# Patient Record
Sex: MALE | Race: WHITE | NOT HISPANIC OR LATINO | Employment: OTHER | URBAN - METROPOLITAN AREA
[De-identification: names, ages, dates, MRNs, and addresses within clinical notes are randomized per-mention and may not be internally consistent; named-entity substitution may affect disease eponyms.]

---

## 2024-10-02 ENCOUNTER — APPOINTMENT (OUTPATIENT)
Dept: CARDIOLOGY | Facility: HOSPITAL | Age: 74
End: 2024-10-02
Payer: MEDICARE

## 2024-10-02 ENCOUNTER — HOSPITAL ENCOUNTER (EMERGENCY)
Facility: HOSPITAL | Age: 74
Discharge: HOME | End: 2024-10-02
Attending: EMERGENCY MEDICINE
Payer: MEDICARE

## 2024-10-02 ENCOUNTER — APPOINTMENT (OUTPATIENT)
Dept: RADIOLOGY | Facility: HOSPITAL | Age: 74
End: 2024-10-02
Payer: MEDICARE

## 2024-10-02 VITALS
HEIGHT: 71 IN | BODY MASS INDEX: 25.9 KG/M2 | RESPIRATION RATE: 19 BRPM | WEIGHT: 185 LBS | TEMPERATURE: 98 F | HEART RATE: 73 BPM | OXYGEN SATURATION: 98 % | SYSTOLIC BLOOD PRESSURE: 120 MMHG | DIASTOLIC BLOOD PRESSURE: 92 MMHG

## 2024-10-02 DIAGNOSIS — I49.3 FREQUENT PVCS: ICD-10-CM

## 2024-10-02 DIAGNOSIS — R55 SYNCOPE AND COLLAPSE: ICD-10-CM

## 2024-10-02 DIAGNOSIS — S09.90XA CLOSED HEAD INJURY, INITIAL ENCOUNTER: Primary | ICD-10-CM

## 2024-10-02 DIAGNOSIS — S60.222A CONTUSION OF DORSUM OF LEFT HAND: ICD-10-CM

## 2024-10-02 LAB
ABO GROUP (TYPE) IN BLOOD: NORMAL
ALBUMIN SERPL BCP-MCNC: 4 G/DL (ref 3.4–5)
ALP SERPL-CCNC: 108 U/L (ref 33–136)
ALT SERPL W P-5'-P-CCNC: 12 U/L (ref 10–52)
ANION GAP SERPL CALCULATED.3IONS-SCNC: 9 MMOL/L (ref 10–20)
ANTIBODY SCREEN: NORMAL
AST SERPL W P-5'-P-CCNC: 15 U/L (ref 9–39)
BASOPHILS # BLD AUTO: 0.04 X10*3/UL (ref 0–0.1)
BASOPHILS NFR BLD AUTO: 0.4 %
BILIRUB SERPL-MCNC: 1.2 MG/DL (ref 0–1.2)
BODY SURFACE AREA: 2.05 M2
BUN SERPL-MCNC: 22 MG/DL (ref 6–23)
CALCIUM SERPL-MCNC: 9.1 MG/DL (ref 8.6–10.3)
CHLORIDE SERPL-SCNC: 107 MMOL/L (ref 98–107)
CO2 SERPL-SCNC: 28 MMOL/L (ref 21–32)
CREAT SERPL-MCNC: 1.07 MG/DL (ref 0.5–1.3)
EGFRCR SERPLBLD CKD-EPI 2021: 73 ML/MIN/1.73M*2
EOSINOPHIL # BLD AUTO: 0.19 X10*3/UL (ref 0–0.4)
EOSINOPHIL NFR BLD AUTO: 1.7 %
ERYTHROCYTE [DISTWIDTH] IN BLOOD BY AUTOMATED COUNT: 13.4 % (ref 11.5–14.5)
ETHANOL SERPL-MCNC: <10 MG/DL
GLUCOSE SERPL-MCNC: 109 MG/DL (ref 74–99)
HCT VFR BLD AUTO: 43.2 % (ref 41–52)
HGB BLD-MCNC: 14 G/DL (ref 13.5–17.5)
IMM GRANULOCYTES # BLD AUTO: 0.06 X10*3/UL (ref 0–0.5)
IMM GRANULOCYTES NFR BLD AUTO: 0.5 % (ref 0–0.9)
INR PPP: 1.1 (ref 0.9–1.2)
LYMPHOCYTES # BLD AUTO: 2.02 X10*3/UL (ref 0.8–3)
LYMPHOCYTES NFR BLD AUTO: 18 %
MCH RBC QN AUTO: 30.6 PG (ref 26–34)
MCHC RBC AUTO-ENTMCNC: 32.4 G/DL (ref 32–36)
MCV RBC AUTO: 95 FL (ref 80–100)
MONOCYTES # BLD AUTO: 0.43 X10*3/UL (ref 0.05–0.8)
MONOCYTES NFR BLD AUTO: 3.8 %
NEUTROPHILS # BLD AUTO: 8.5 X10*3/UL (ref 1.6–5.5)
NEUTROPHILS NFR BLD AUTO: 75.6 %
NRBC BLD-RTO: 0 /100 WBCS (ref 0–0)
PLATELET # BLD AUTO: 211 X10*3/UL (ref 150–450)
POTASSIUM SERPL-SCNC: 4.3 MMOL/L (ref 3.5–5.3)
PROT SERPL-MCNC: 6.8 G/DL (ref 6.4–8.2)
PROTHROMBIN TIME: 11.1 SECONDS (ref 9.3–12.7)
RBC # BLD AUTO: 4.57 X10*6/UL (ref 4.5–5.9)
RH FACTOR (ANTIGEN D): NORMAL
SODIUM SERPL-SCNC: 140 MMOL/L (ref 136–145)
WBC # BLD AUTO: 11.2 X10*3/UL (ref 4.4–11.3)

## 2024-10-02 PROCEDURE — 96360 HYDRATION IV INFUSION INIT: CPT

## 2024-10-02 PROCEDURE — 93242 EXT ECG>48HR<7D RECORDING: CPT

## 2024-10-02 PROCEDURE — 85610 PROTHROMBIN TIME: CPT | Performed by: EMERGENCY MEDICINE

## 2024-10-02 PROCEDURE — 71045 X-RAY EXAM CHEST 1 VIEW: CPT | Performed by: RADIOLOGY

## 2024-10-02 PROCEDURE — 85025 COMPLETE CBC W/AUTO DIFF WBC: CPT | Performed by: EMERGENCY MEDICINE

## 2024-10-02 PROCEDURE — 2500000004 HC RX 250 GENERAL PHARMACY W/ HCPCS (ALT 636 FOR OP/ED): Performed by: EMERGENCY MEDICINE

## 2024-10-02 PROCEDURE — 72125 CT NECK SPINE W/O DYE: CPT

## 2024-10-02 PROCEDURE — 70450 CT HEAD/BRAIN W/O DYE: CPT

## 2024-10-02 PROCEDURE — 99285 EMERGENCY DEPT VISIT HI MDM: CPT | Mod: 25

## 2024-10-02 PROCEDURE — 82077 ASSAY SPEC XCP UR&BREATH IA: CPT | Performed by: EMERGENCY MEDICINE

## 2024-10-02 PROCEDURE — 36415 COLL VENOUS BLD VENIPUNCTURE: CPT | Performed by: EMERGENCY MEDICINE

## 2024-10-02 PROCEDURE — 84132 ASSAY OF SERUM POTASSIUM: CPT | Performed by: EMERGENCY MEDICINE

## 2024-10-02 PROCEDURE — 99221 1ST HOSP IP/OBS SF/LOW 40: CPT | Performed by: STUDENT IN AN ORGANIZED HEALTH CARE EDUCATION/TRAINING PROGRAM

## 2024-10-02 PROCEDURE — G0390 TRAUMA RESPONS W/HOSP CRITI: HCPCS

## 2024-10-02 PROCEDURE — 71045 X-RAY EXAM CHEST 1 VIEW: CPT

## 2024-10-02 PROCEDURE — 86901 BLOOD TYPING SEROLOGIC RH(D): CPT | Performed by: EMERGENCY MEDICINE

## 2024-10-02 ASSESSMENT — COLUMBIA-SUICIDE SEVERITY RATING SCALE - C-SSRS
1. IN THE PAST MONTH, HAVE YOU WISHED YOU WERE DEAD OR WISHED YOU COULD GO TO SLEEP AND NOT WAKE UP?: NO
6. HAVE YOU EVER DONE ANYTHING, STARTED TO DO ANYTHING, OR PREPARED TO DO ANYTHING TO END YOUR LIFE?: NO
2. HAVE YOU ACTUALLY HAD ANY THOUGHTS OF KILLING YOURSELF?: NO

## 2024-10-02 ASSESSMENT — PAIN SCALES - GENERAL: PAINLEVEL_OUTOF10: 0 - NO PAIN

## 2024-10-02 ASSESSMENT — PAIN - FUNCTIONAL ASSESSMENT: PAIN_FUNCTIONAL_ASSESSMENT: 0-10

## 2024-10-02 ASSESSMENT — ENCOUNTER SYMPTOMS: DIZZINESS: 1

## 2024-10-02 NOTE — CONSULTS
Assessment/Plan     Inpatient consult to Acute Care Surgery  Consult performed by: Hazel Medina MD  Consult ordered by: Kia Steve MD  Reason for consult: LIMITED TRAUMA  Assessment/Recommendations: 74M BIBEMS s/p unwitnessed ground level fall with head strike, unknown LOC. Hx Factor V Leiden with hx of PE, on apixaban.     No acute traumatic injuries appreciated on imaging  Dispo per ED        Subjective     Fall    Dizziness        Review of Systems  Review of Systems   Neurological:  Positive for dizziness.       Objective     Vital signs for last 24 hours:  Temperature:  [36.7 °C (98 °F)] 36.7 °C (98 °F)  Heart Rate:  [62-70] 62  Respirations:  [16] 16  BP: (137)/(77) 137/77        Labs  CBC:   Lab Results   Component Value Date    WBC 11.2 10/02/2024    RBC 4.57 10/02/2024     BMP:   Lab Results   Component Value Date    GLUCOSE 109 (H) 10/02/2024    CO2 28 10/02/2024    BUN 22 10/02/2024    CREATININE 1.07 10/02/2024    CALCIUM 9.1 10/02/2024     Radiology review: CT cervical spine wo IV contrast    Result Date: 10/2/2024  Interpreted By:  Schoenberger, Joseph, STUDY: CT CERVICAL SPINE WO IV CONTRAST;  10/2/2024 2:22 pm   INDICATION: Signs/Symptoms:trauma.  The the the     COMPARISON: None.   ACCESSION NUMBER(S): [id number]   ORDERING CLINICIAN: KIA STEVE   TECHNIQUE: Axial CT images of the cervical spine are obtained. Axial, coronal and sagittal reconstructions are provided for review.   FINDINGS:     Fractures: There is no evidence for an acute fracture of the cervical spine.   Vertebral Alignment: There is a loss of the normal lordotic curve of the cervical spine. The sagittal alignment is otherwise maintained.   Craniocervical Junction: The odontoid process and craniocervical junction are intact.   Vertebrae/Disc Spaces:  All cervical vertebra are maintained in height without vertebral body fracture. There is moderate to severe degenerative narrowing of the C6-C7 and C7-T1 discs.  The other discs are better maintained in height.   Prevertebral/Paraspinal Soft Tissues: The prevertebral and paraspinal soft tissues are unremarkable.   Posterior elements are aligned normally without fracture or subluxation. Multilevel facet hypertrophy most prominent on the left at the C3-C4 level.       Degenerative changes without acute fracture or subluxation.   MACRO: None   Signed by: Joseph Schoenberger 10/2/2024 2:31 PM Dictation workstation:   [id number]    CT head W O contrast trauma protocol    Result Date: 10/2/2024  Interpreted By:  Schoenberger, Joseph, STUDY: CT HEAD W/O CONTRAST TRAUMA PROTOCOL;  10/2/2024 2:22 pm   INDICATION: Signs/Symptoms:trauma.     COMPARISON: None.   ACCESSION NUMBER(S): [id number]   ORDERING CLINICIAN: KIA STEVE   TECHNIQUE: Noncontrast axial CT scan of head was performed. Angled reformats in brain and bone windows were generated. The images were reviewed in bone, brain, blood and soft tissue windows.   FINDINGS: CSF Spaces: Enlarged due to parenchymal volume loss. Normal configuration within type basal cisterns. There is no extraaxial fluid collection.   Parenchyma: Periventricular deep white matter hypoattenuation consistent with small vessel ischemic white matter demyelination. The grey-white differentiation is intact. There is no mass effect or midline shift.  There is no intracranial hemorrhage.   Calvarium: The calvarium is unremarkable.   Paranasal sinuses and mastoids: No significant opacities.       Atrophy and chronic ischemic white matter change without acute intracranial findings   No evidence of intracranial hemorrhage or displaced skull fracture.   MACRO: None   Signed by: Joseph Schoenberger 10/2/2024 2:28 PM Dictation workstation:   [id number]    XR chest 1 view    Result Date: 10/2/2024  Interpreted By:  Mik Burnette, STUDY: XR CHEST 1 VIEW; 10/2/2024 1:44 pm   INDICATION: CLINICAL INFORMATION: Signs/Symptoms:Trauma. Status post fall.    COMPARISON: 03/11/2022   ACCESSION NUMBER(S): [id number]   ORDERING CLINICIAN: KIA STEVE   TECHNIQUE: Portable chest one view.   FINDINGS: The cardiac size is indeterminate in view of the AP projection. There is no evidence for pneumothorax or pneumomediastinum. Lungs appear somewhat hyperinflated suggesting underlying COPD. No fractures are identified within limits of this study. No infiltrates or effusions are identified.       No acute pathologic findings are identified. Probable COPD.   MACRO: none   Signed by: Mik Burnette 10/2/2024 2:00 PM Dictation workstation:   [id number]

## 2024-10-02 NOTE — ED PROVIDER NOTES
HPI   Chief Complaint   Patient presents with    Fall    Dizziness       74-year-old male presents for syncopal versus near syncopal episode.  States that he got up from his recliner chair to go to the bathroom was walking down the hallway and felt very lightheaded and dizzy which caused him to fall.  He is not sure if he struck his head.  Limited trauma was activated given possible loss of consciousness as EMS reported patient was having difficulty recalling what room of the house he was in and was not readily able to tell them the exact circumstances of the incident.  However, on arrival patient states that he remembers the entire event and does not believe he fully lost consciousness.  Denies head injury or neck pain.  States he did have a small bump on his hand but denies any other significant areas of injury.  States he was in his usual state of health this morning.  No chest pain or shortness of breath.  He is currently asymptomatic.  He reports taking Eliquis although he believes this was due to a mini stroke in the past cannot recall the exact reason.  Nuys any significant cardiac history.      History provided by:  Medical records and EMS personnel          Patient History   No past medical history on file.  No past surgical history on file.  No family history on file.  Social History     Tobacco Use    Smoking status: Not on file    Smokeless tobacco: Not on file   Substance Use Topics    Alcohol use: Not on file    Drug use: Not on file       Physical Exam   ED Triage Vitals [10/02/24 1333]   Temperature Heart Rate Respirations BP   36.7 °C (98 °F) 70 16 137/77      Pulse Ox Temp src Heart Rate Source Patient Position   97 % -- -- --      BP Location FiO2 (%)     -- --       Physical Exam  Vitals and nursing note reviewed.     General: Vitals reviewed. Awake, alert, well-developed, well-nourished, NAD, airway patent  HEENT: NC/AT, PERRL, MMM, no facial contusions,  no bleeding per nares, septum midline    Neck: Supple, trachea midline, no midline C-spine tenderness , step-offs , or deformities, no expanding hematomas, no stridor,c-collar in place  Respiratory: No respiratory distress, lungs clear to auscultation bilaterally, no wheezes, rhonchi, or rales  CV: Regular rate and regular rhythm, no murmur/gallop/rubs  Abdomen/GI: Soft, non-tender, non-distended, no rebound, guarding, or rigidity, normal bowel sounds  Extremities/MSK : Moving all extremities, no deformities, no bony tenderness to palpation in the extremities and range of motion of all joints within normal limits, small contusion on dorsum of left hand, no midline TLS tenderness, step-offs , or deformities   Neuro: A/O x3, GCS 15, cranial nerves intact, no new focal motor or sensory deficits, no ataxia, NIH 0 normal test of skew  Skin: Warm, dry. No rashes identified      ED Course & MDM   ED Course as of 10/03/24 1124   Wed Oct 02, 2024   1435 Cts negative, will clear C collar   [EDSON]   1442 EKG on my independent interpretation: Sinus rhythm 72 bpm with frequent PVCs, normal axis, normal intervals, no acute ST or T wave abnormalities [EDSON]   1504 Patient ambulated without difficulty, remains asymptomatic [EDSON]      ED Course User Index  [EDSON] Brenda Randall MD         Diagnoses as of 10/03/24 1124   Closed head injury, initial encounter   Syncope and collapse   Contusion of dorsum of left hand   Frequent PVCs                 No data recorded     Patric Coma Scale Score: 15 (10/02/24 1332 : Jaquelin Pan RN)                           Medical Decision Making  74-year-old male presents for possible syncopal episode versus fall with head injury and subsequent loss of consciousness.  Per patient's description sounds more consistent with syncope/near syncope although patient insists he did not fully lose consciousness and does not describe any mechanical fall or areas of pain or significant injury aside from small contusion of the left hand.  Nevertheless  due to loss of consciousness associated with head injury on anticoagulant limited trauma was activated.  ABCs intact, GCS 15, exposure and secondary survey as above with no evidence of significant injury, however given conflicting history between EMS report and patient report intracranial hemorrhage or head injury was considered therefore patient sent for CT brain, cervical spine which showed no acute traumatic injuries.  Chest x-ray performed at bedside on my independent or potation with no acute cardiopulmonary process or evidence of trauma.  EKG from a syncope standpoint shows no acute concerning arrhythmia although there are frequent PVCs and workup including labs without significant abnormality.  No specific concerning features for suspected cardiac etiology therefore from a syncope/near syncope standpoint do feel would be appropriate for outpatient management however recommended close follow-up with cardiology and Zio patch was placed.  Of a trauma standpoint no injury requiring hospitalization and advised supportive care of contusion to left hand as well as close head injury instructions.  Stable for outpatient management.  Return precautions discussed    CRITICAL CARE NOTE:    Upon my evaluation, this patient had a high probability of imminent or life-threatening deterioration due to limited trauma activation, which required my direct attention, intervention, and personal management    31 total minutes of critical care were personally provided which excludes and was non concurrent with other providers and all other billable procedures.  This was for time at the bedside, re-evaluations, interpretation of lab and imaging results, discussions with consultants, and monitoring for potential decompensation.  Intervention were performed as documented above.    Amount and/or Complexity of Data Reviewed  Independent Historian: EMS  External Data Reviewed: notes.     Details: 8/1/2020 outpatient echo reviewed showing  EF of 55-60%  Labs: ordered. Decision-making details documented in ED Course.  Radiology: ordered and independent interpretation performed. Decision-making details documented in ED Course.  ECG/medicine tests: ordered and independent interpretation performed. Decision-making details documented in ED Course.        Procedure  Procedures     Brenda Randall MD  10/03/24 1124

## 2024-10-02 NOTE — DISCHARGE INSTRUCTIONS
"The cause of your episode of passing out is unclear.  This at times can be related to  nerve what is called a vasovagal episode where your heart rate and or blood pressure slow down causing you to lose consciousness briefly.  These episodes are often benign, however may require follow-up with your primary care physician particularly if you have recurrent episodes for further evaluation.  There was no evidence of abnormal heart rhythm today, however you can have abnormal heart rhythms that come and go and may need further evaluation.  Drink plenty of fluids and change positions slowly.  Follow up with your cardiologist as soon as possible for further care.  Immediately if this event happens again as you may require hospitalization for further management.    After a head injury it is recommended that you rest your brain for 24 hours which means avoid \"screen time\" such as tv, computer, or phone and rest in a quiet space as much as possible. You should avoid any contact sports or other activities that put you at risk for a second head injury for at least one week or until all of your symptoms have resolved.  Over-the-counter medications as needed for pain.    Post-concussive syndrome can occur after head injury and includes headache, nausea, lightheadedness, difficulty concentrating, and many other symptoms that may last days to weeks, but most often resolve on their own.     There is a small percentage of patients who will have delayed bleeding in the brain after a head injury, especially if they are on blood thinner medications. This can develop in the first 12-24 hours after the injury.  If you develop any significant headaches, persistent vomiting, change in mental status, weakness in an arm or leg, confusion, or other neurologic symptoms, please return to the emergency department immediately for reevaluation.    "

## 2024-10-21 ENCOUNTER — OFFICE VISIT (OUTPATIENT)
Dept: CARDIOLOGY | Facility: CLINIC | Age: 74
End: 2024-10-21
Payer: MEDICARE

## 2024-10-21 VITALS
WEIGHT: 181 LBS | HEART RATE: 51 BPM | DIASTOLIC BLOOD PRESSURE: 60 MMHG | SYSTOLIC BLOOD PRESSURE: 90 MMHG | OXYGEN SATURATION: 99 % | BODY MASS INDEX: 25.24 KG/M2

## 2024-10-21 DIAGNOSIS — R55 NEAR SYNCOPE: ICD-10-CM

## 2024-10-21 DIAGNOSIS — I95.1 ORTHOSTATIC HYPOTENSION: Primary | ICD-10-CM

## 2024-10-21 PROCEDURE — 99214 OFFICE O/P EST MOD 30 MIN: CPT | Performed by: INTERNAL MEDICINE

## 2024-10-21 PROCEDURE — 99204 OFFICE O/P NEW MOD 45 MIN: CPT | Performed by: INTERNAL MEDICINE

## 2024-10-21 PROCEDURE — 1159F MED LIST DOCD IN RCRD: CPT | Performed by: INTERNAL MEDICINE

## 2024-10-21 RX ORDER — ATORVASTATIN CALCIUM 80 MG/1
1 TABLET, FILM COATED ORAL NIGHTLY
COMMUNITY
Start: 2024-08-12

## 2024-10-21 RX ORDER — METOPROLOL TARTRATE 100 MG/1
TABLET ORAL EVERY 12 HOURS
COMMUNITY
End: 2024-10-21 | Stop reason: ALTCHOICE

## 2024-10-21 RX ORDER — ESCITALOPRAM OXALATE 20 MG/1
TABLET ORAL EVERY 24 HOURS
COMMUNITY

## 2024-10-21 RX ORDER — LOSARTAN POTASSIUM 50 MG/1
1 TABLET ORAL DAILY
COMMUNITY
Start: 2024-08-12

## 2024-10-21 RX ORDER — CHOLECALCIFEROL (VITAMIN D3) 50 MCG
1 TABLET ORAL DAILY
COMMUNITY
Start: 2024-08-12

## 2024-10-21 RX ORDER — CARVEDILOL 6.25 MG/1
1 TABLET ORAL 2 TIMES DAILY
COMMUNITY
Start: 2024-08-12

## 2024-10-21 ASSESSMENT — PATIENT HEALTH QUESTIONNAIRE - PHQ9
SUM OF ALL RESPONSES TO PHQ9 QUESTIONS 1 AND 2: 0
2. FEELING DOWN, DEPRESSED OR HOPELESS: NOT AT ALL
1. LITTLE INTEREST OR PLEASURE IN DOING THINGS: NOT AT ALL

## 2024-10-21 ASSESSMENT — ENCOUNTER SYMPTOMS
LOSS OF SENSATION IN FEET: 0
COUGH: 0
EYES NEGATIVE: 1
FALLS: 1
RESPIRATORY NEGATIVE: 1
DEPRESSION: 0
OCCASIONAL FEELINGS OF UNSTEADINESS: 1
FEVER: 0
CONSTITUTIONAL NEGATIVE: 1
NEUROLOGICAL NEGATIVE: 1
CHILLS: 0
ENDOCRINE NEGATIVE: 1
GASTROINTESTINAL NEGATIVE: 1
NEAR-SYNCOPE: 1

## 2024-10-21 NOTE — PROGRESS NOTES
Subjective      Chief Complaint   Patient presents with    patient was seen in er had monitor on          This is 74-year-old white male we are asked to see after fall being in the emergency room.  He does have a history of having his stroke approximately 2 and half years ago.  He is somewhat debilitated he lives at home and has help.  When he fell he hit his head he was walking from 1 room to another the time.  He does have trouble walking.  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  He went to the emergency room CT scans were negative.  He does have factor V Leiden deficiency and is on Eliquis for this.  He underwent an event monitor which showed normal sinus rhythm occasional supraventricular arrhythmias but otherwise unremarkable.  Never had rheumatic fever nor told he had a heart murmur.  His EKG in the emergency room shows a normal sinus rhythm with frequent PVCs.           Review of Systems   Constitutional: Negative. Negative for chills and fever.   HENT: Negative.     Eyes: Negative.    Cardiovascular:  Positive for near-syncope.   Respiratory: Negative.  Negative for cough.    Endocrine: Negative.    Skin: Negative.    Musculoskeletal:  Positive for arthritis and falls.        He has generalized weakness from his stroke.   Gastrointestinal: Negative.    Genitourinary: Negative.    Neurological: Negative.    All other systems reviewed and are negative.       Past Surgical History:   Procedure Laterality Date    APPENDECTOMY          Active Ambulatory Problems     Diagnosis Date Noted    Orthostatic hypotension 10/21/2024    Near syncope 10/21/2024     Resolved Ambulatory Problems     Diagnosis Date Noted    No Resolved Ambulatory Problems     Past Medical History:   Diagnosis Date    Hyperlipidemia     Hypertension     Stroke (Multi)         Visit Vitals  BP 90/60 (Patient Position: Standing)   Pulse 51   Wt 82.1 kg (181 lb)   SpO2 99%   BMI 25.24 kg/m²  "  Smoking Status Every Day   BSA 2.03 m²        Objective     Constitutional:       Appearance: Healthy appearance.   Eyes:      Pupils: Pupils are equal, round, and reactive to light.   Neck:      Vascular: JVD normal.   Pulmonary:      Breath sounds: Normal breath sounds.   Cardiovascular:      PMI at left midclavicular line. Normal rate. Regular rhythm. Normal S1. Normal S2.       Murmurs: There is no murmur.      No gallop.  No click. No rub.   Pulses:     Intact distal pulses.   Edema:     Peripheral edema absent.   Abdominal:      Palpations: Abdomen is soft.      Tenderness: There is no abdominal tenderness.   Musculoskeletal:      Extremities: No clubbing present.Skin:     General: Skin is warm and dry.   Neurological:      General: No focal deficit present.            Lab Review:   -      Lab Results   Component Value Date    CHOL 76 (L) 03/11/2022    CHOL 110 (L) 01/13/2022     Lab Results   Component Value Date    HDL 26 (L) 03/11/2022    HDL 36 (L) 01/13/2022     Lab Results   Component Value Date    LDLCALC 31 (L) 03/11/2022    LDLCALC 58 (L) 01/13/2022     Lab Results   Component Value Date    TRIG 97 03/11/2022    TRIG 79 01/13/2022     No components found for: \"CHOLHDL\"     Assessment/Plan     Near syncope  This is a 74-year-old white male who had a near syncopal episode.  On physical exam his blood pressure does drop from 110 down to 90 systolic.  He has been on metoprolol as well as Coreg.  Will wean him off the metoprolol.  He is extremely debilitated and this may be playing a factor.  [facility]  Will stop metoprolol we will see him back as needed he is to [facility] and if the blood pressure continues to drop would then stop the losartan.     "

## 2024-10-21 NOTE — ASSESSMENT & PLAN NOTE
This is a 74-year-old white male who had a near syncopal episode.  On physical exam his blood pressure does drop from 110 down to 90 systolic.  He has been on metoprolol as well as Coreg.  Will wean him off the metoprolol.  He is extremely debilitated and this may be playing a factor.  [facility] is [facility].  Will stop metoprolol we will see him back as needed he is to follow-up with [facility] and if the blood pressure continues to drop would then stop the losartan.

## 2024-10-23 LAB — BODY SURFACE AREA: 2.05 M2

## 2024-10-24 ENCOUNTER — TELEPHONE (OUTPATIENT)
Dept: CARDIOLOGY | Facility: CLINIC | Age: 74
End: 2024-10-24

## 2024-10-24 NOTE — TELEPHONE ENCOUNTER
Pt relative called the office stated pt was not taking Metoprolol Tart 100 mg tab the time of recent office visit   Pt relative called the office stated she is unsure what medication he should stop  I did inform Relative Dr murphy stated to stop Losartan if blood pressure continue to drop  Please advice   Good all back number  [phone]